# Patient Record
Sex: FEMALE | Race: WHITE | NOT HISPANIC OR LATINO | URBAN - METROPOLITAN AREA
[De-identification: names, ages, dates, MRNs, and addresses within clinical notes are randomized per-mention and may not be internally consistent; named-entity substitution may affect disease eponyms.]

---

## 2019-11-17 ENCOUNTER — EMERGENCY (EMERGENCY)
Facility: HOSPITAL | Age: 24
LOS: 0 days | Discharge: HOME | End: 2019-11-17
Attending: EMERGENCY MEDICINE
Payer: COMMERCIAL

## 2019-11-17 VITALS
TEMPERATURE: 98 F | SYSTOLIC BLOOD PRESSURE: 137 MMHG | OXYGEN SATURATION: 100 % | HEART RATE: 96 BPM | DIASTOLIC BLOOD PRESSURE: 88 MMHG | RESPIRATION RATE: 16 BRPM

## 2019-11-17 VITALS — WEIGHT: 214.95 LBS

## 2019-11-17 DIAGNOSIS — N76.0 ACUTE VAGINITIS: ICD-10-CM

## 2019-11-17 DIAGNOSIS — N76.2 ACUTE VULVITIS: ICD-10-CM

## 2019-11-17 DIAGNOSIS — R31.21 ASYMPTOMATIC MICROSCOPIC HEMATURIA: ICD-10-CM

## 2019-11-17 LAB
APPEARANCE UR: ABNORMAL
BILIRUB UR-MCNC: NEGATIVE — SIGNIFICANT CHANGE UP
COLOR SPEC: YELLOW — SIGNIFICANT CHANGE UP
DIFF PNL FLD: ABNORMAL
GLUCOSE UR QL: NEGATIVE MG/DL — SIGNIFICANT CHANGE UP
HSV+VZV DNA SPEC QL NAA+PROBE: ABNORMAL
KETONES UR-MCNC: ABNORMAL
LEUKOCYTE ESTERASE UR-ACNC: ABNORMAL
NITRITE UR-MCNC: NEGATIVE — SIGNIFICANT CHANGE UP
PH UR: 5 — SIGNIFICANT CHANGE UP (ref 5–8)
PROT UR-MCNC: 15 MG/DL
SP GR SPEC: 1.02 — SIGNIFICANT CHANGE UP (ref 1.01–1.02)
SPECIMEN SOURCE: SIGNIFICANT CHANGE UP
UROBILINOGEN FLD QL: NEGATIVE MG/DL — SIGNIFICANT CHANGE UP

## 2019-11-17 PROCEDURE — 99283 EMERGENCY DEPT VISIT LOW MDM: CPT | Mod: 25

## 2019-11-17 PROCEDURE — 87798 DETECT AGENT NOS DNA AMP: CPT

## 2019-11-17 PROCEDURE — 87086 URINE CULTURE/COLONY COUNT: CPT

## 2019-11-17 PROCEDURE — 96372 THER/PROPH/DIAG INJ SC/IM: CPT

## 2019-11-17 PROCEDURE — 87591 N.GONORRHOEAE DNA AMP PROB: CPT

## 2019-11-17 PROCEDURE — 81001 URINALYSIS AUTO W/SCOPE: CPT

## 2019-11-17 PROCEDURE — 86695 HERPES SIMPLEX TYPE 1 TEST: CPT

## 2019-11-17 PROCEDURE — 99283 EMERGENCY DEPT VISIT LOW MDM: CPT

## 2019-11-17 PROCEDURE — 86696 HERPES SIMPLEX TYPE 2 TEST: CPT

## 2019-11-17 PROCEDURE — 36415 COLL VENOUS BLD VENIPUNCTURE: CPT

## 2019-11-17 PROCEDURE — 87491 CHLMYD TRACH DNA AMP PROBE: CPT

## 2019-11-17 PROCEDURE — 87529 HSV DNA AMP PROBE: CPT

## 2019-11-17 PROCEDURE — 87186 SC STD MICRODIL/AGAR DIL: CPT

## 2019-11-17 RX ORDER — IBUPROFEN 200 MG
600 TABLET ORAL ONCE
Refills: 0 | Status: COMPLETED | OUTPATIENT
Start: 2019-11-17 | End: 2019-11-17

## 2019-11-17 RX ORDER — VALACYCLOVIR 500 MG/1
1 TABLET, FILM COATED ORAL
Qty: 14 | Refills: 0
Start: 2019-11-17 | End: 2019-11-23

## 2019-11-17 RX ORDER — CEFTRIAXONE 500 MG/1
250 INJECTION, POWDER, FOR SOLUTION INTRAMUSCULAR; INTRAVENOUS ONCE
Refills: 0 | Status: COMPLETED | OUTPATIENT
Start: 2019-11-17 | End: 2019-11-17

## 2019-11-17 RX ORDER — AZITHROMYCIN 500 MG/1
1000 TABLET, FILM COATED ORAL ONCE
Refills: 0 | Status: COMPLETED | OUTPATIENT
Start: 2019-11-17 | End: 2019-11-17

## 2019-11-17 RX ORDER — LIDOCAINE 4 G/100G
10 CREAM TOPICAL
Qty: 150 | Refills: 0
Start: 2019-11-17

## 2019-11-17 RX ADMIN — Medication 600 MILLIGRAM(S): at 12:59

## 2019-11-17 RX ADMIN — CEFTRIAXONE 250 MILLIGRAM(S): 500 INJECTION, POWDER, FOR SOLUTION INTRAMUSCULAR; INTRAVENOUS at 14:25

## 2019-11-17 RX ADMIN — AZITHROMYCIN 1000 MILLIGRAM(S): 500 TABLET, FILM COATED ORAL at 14:26

## 2019-11-17 NOTE — ED STATDOCS - NSFOLLOWUPINSTRUCTIONS_ED_ALL_ED_FT
Vaginitis  Image   Vaginitis is irritation and swelling (inflammation) of the vagina. It happens when normal bacteria and yeast in the vagina grow too much. There are many types of this condition. Treatment will depend on the type you have.  Follow these instructions at home:  Lifestyle     Keep your vagina area clean and dry.  Avoid using soap.Rinse the area with water.Do not do the following until your doctor says it is okay:  Wash and clean out the vagina (douche).Use tampons.Have sex.Wipe from front to back after going to the bathroom.Let air reach your vagina.  Wear cotton underwear.Do not wear:  Underwear while you sleep.Tight pants.Thong underwear.Underwear or nylons without a cotton panel.Take off any wet clothing, such as bathing suits, as soon as possible.Use gentle, non-scented products. Do not use things that can irritate the vagina, such as fabric softeners. Avoid the following products if they are scented:  Feminine sprays.Detergents.Tampons.Feminine hygiene products.Soaps or bubble baths.Practice safe sex and use condoms.General instructions     Take over-the-counter and prescription medicines only as told by your doctor.If you were prescribed an antibiotic medicine, take or use it as told by your doctor. Do not stop taking or using the antibiotic even if you start to feel better.Keep all follow-up visits as told by your doctor. This is important.Contact a doctor if:  You have pain in your belly.You have a fever.Your symptoms last for more than 2–3 days.Get help right away if:  You have a fever and your symptoms get worse all of a sudden.Summary  Vaginitis is irritation and swelling of the vagina. It can happen when the normal bacteria and yeast in the vagina grow too much. There are many types.Treatment will depend on the type you have.Do not douche, use tampons , or have sex until your health care provider approves. When you can return to sex, practice safe sex and use condoms.This information is not intended to replace advice given to you by your health care provider. Make sure you discuss any questions you have with your health care provider.

## 2019-11-17 NOTE — ED ADULT TRIAGE NOTE - CHIEF COMPLAINT QUOTE
pt c/o vaginal sores, that have developed over the apst week. pt states on monday she had a yeast infection, and that on  tuesday she took monastat, pt sattes since then she ahs developed sores. no fever. pt states she does not know if this is an allergic reaction to monastat

## 2019-11-17 NOTE — ED STATDOCS - PROGRESS NOTE DETAILS
23 y/o F with no PMH presents with vaginal sores worse since yesterday. Pt states she initiated treatment for yeast infection with monistat last weel x 3 days. States symptoms worsened over the last 2-3 days. Went to outside urgent care, was started on prednisone and diflucan. States symptoms have not changed. Denies fever, chills, nausea, vomiting. States she has had 1 sexual partner since her last STD assessment 1 year ago. Minimal improvement in symptoms with use of advil. +dysuria. PE: Uncomfortable appearing. Cardiac: s1s2, RRR. Lungs: CTAB. Abdomen: NBS x4, soft, nontender. ; chaperoned by JOLLY Rosado. +multiple sores to labia. No active discharge. ++TTP external labia. A/P: Vaginal pain. 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 Plan for UA, labs, culture of sores. Reassess. - Iam Mccloud PA-C 25 y/o F with no PMH presents with vaginal sores worse since yesterday. Pt states she initiated treatment for yeast infection with monistat last weel x 3 days. States symptoms worsened over the last 2-3 days. Went to outside urgent care, was started on prednisone and diflucan. States symptoms have not changed. Denies fever, chills, nausea, vomiting. States she has had 1 sexual partner since her last STD assessment 1 year ago. Minimal improvement in symptoms with use of advil. +dysuria. PE: Uncomfortable appearing. Cardiac: s1s2, RRR. Lungs: CTAB. Abdomen: NBS x4, soft, nontender. ; chaperoned by JOLLY Rodney. +multiple sores to labia. No active discharge. ++TTP external labia. A/P: Vaginal pain. 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 Plan for UA, labs, culture of sores. Reassess. - Iam Mccloud PA-C UA reviewed with patient. Will manage as HSV infection. Advised to continue with diflucan. Will cover for GC/Chlamydia as well. Encouraged OBGYN follow up. Will also provide urology referral due to calcium oxalate crystals and microscopic hematuria. - Iam Mccloud PA-C

## 2019-11-17 NOTE — ED STATDOCS - CARE PROVIDER_API CALL
Ilana Tam)  Urology  [address] [place] [address]  Phone: [phone]  Fax: [phone]  Follow Up Time:

## 2019-11-17 NOTE — ED STATDOCS - GENITOURINARY, MLM
normal... sores to external labia no vaginal discharge, pt can not tolerate internal exam, no fluctuance. RN as chaperone

## 2019-11-17 NOTE — ED STATDOCS - PATIENT PORTAL LINK FT
You can access the FollowMyHealth Patient Portal offered by [facility] by registering at the following website: [email or web address]. By joining [facility]’s FollowMyHealth portal, you will also be able to view your health information using other applications (apps) compatible with our system.

## 2019-11-17 NOTE — ED STATDOCS - CARE PLAN
Principal Discharge DX:	Vulvovaginal candidiasis  Secondary Diagnosis:	Microscopic hematuria Principal Discharge DX:	Acute vaginitis  Secondary Diagnosis:	Microscopic hematuria Principal Discharge DX:	Acute vaginitis  Secondary Diagnosis:	Microscopic hematuria  Secondary Diagnosis:	Vaginal sore

## 2019-11-17 NOTE — ED STATDOCS - ATTENDING CONTRIBUTION TO CARE
I, Michael Porter, performed the initial face to face bedside interview with this patient regarding history of present illness, review of symptoms and relevant past medical, social and family history.  I completed an independent physical examination.  I was the initial provider who evaluated this patient. I have signed out the follow up of any pending tests (i.e. labs, radiological studies) to the ACP.  I have communicated the patient’s plan of care and disposition with the ACP.  The history, relevant review of systems, past medical and surgical history, medical decision making, and physical examination was documented by the scribe in my presence and I attest to the accuracy of the documentation.

## 2019-11-17 NOTE — ED ADULT NURSE NOTE - NSIMPLEMENTINTERV_GEN_ALL_ED
Implemented All Universal Safety Interventions:  [place] to call system. Call bell, personal items and telephone within reach. Instruct patient to call for assistance. Room bathroom lighting operational. Non-slip footwear when patient is off stretcher. Physically safe environment: no spills, clutter or unnecessary equipment. Stretcher in lowest position, wheels locked, appropriate side rails in place.

## 2019-11-17 NOTE — ED STATDOCS - OBJECTIVE STATEMENT
25 y/o female presents to the ED c/o vaginal sores since yesterday. States she had a yeast infection with white discharge that started on 11/11 and took Monistat the next day for 3 days, and then developed worsening vaginal burning and itching. +burning urination. Was seen in UC yesterday and was given Diflucan and Prednisone. Denies fever. Pt is sexually active and had an STI w/u last year and states she has been with the same partner. Took Advil 2 days ago with minimal relief.

## 2019-11-18 LAB
HSV1 IGG SER-ACNC: 0.22 INDEX — SIGNIFICANT CHANGE UP
HSV1 IGG SERPL QL IA: NEGATIVE — SIGNIFICANT CHANGE UP
HSV2 IGG FLD-ACNC: 0.32 INDEX — SIGNIFICANT CHANGE UP
HSV2 IGG FLD-ACNC: 0.32 INDEX — SIGNIFICANT CHANGE UP
HSV2 IGG SERPL QL IA: NEGATIVE — SIGNIFICANT CHANGE UP
HSV2 IGG SERPL QL IA: NEGATIVE — SIGNIFICANT CHANGE UP

## 2019-11-18 NOTE — ED POST DISCHARGE NOTE - RESULT SUMMARY
+HSV1. Spoke to pt on cell number [phone] and informed her of result, that it is contagious and an STI, she must inform sexual partners and use condoms for sex, including oral. Pt has appt with her GYN today, she will discuss further with her. Pt on valtrex.  signed Lana Garcia PA-C

## 2019-11-18 NOTE — ED POST DISCHARGE NOTE - DETAILS
I spoke with patient and sent Rx for Bactrim to pharmacy.  Rose [facility]ORNE urine culture also grew enterococcus, only PO abx both are susceptible to is cipro, called and again changed abx to cipro, advised PMD or GYN f/u for repeat urine culture  Kell Christie PA-C

## 2019-11-19 LAB
-  AMIKACIN: SIGNIFICANT CHANGE UP
-  AMPICILLIN/SULBACTAM: SIGNIFICANT CHANGE UP
-  AMPICILLIN: SIGNIFICANT CHANGE UP
-  AMPICILLIN: SIGNIFICANT CHANGE UP
-  AZTREONAM: SIGNIFICANT CHANGE UP
-  CEFAZOLIN: SIGNIFICANT CHANGE UP
-  CEFEPIME: SIGNIFICANT CHANGE UP
-  CEFOXITIN: SIGNIFICANT CHANGE UP
-  CEFTRIAXONE: SIGNIFICANT CHANGE UP
-  CIPROFLOXACIN: SIGNIFICANT CHANGE UP
-  CIPROFLOXACIN: SIGNIFICANT CHANGE UP
-  ERTAPENEM: SIGNIFICANT CHANGE UP
-  GENTAMICIN: SIGNIFICANT CHANGE UP
-  IMIPENEM: SIGNIFICANT CHANGE UP
-  LEVOFLOXACIN: SIGNIFICANT CHANGE UP
-  LEVOFLOXACIN: SIGNIFICANT CHANGE UP
-  MEROPENEM: SIGNIFICANT CHANGE UP
-  NITROFURANTOIN: SIGNIFICANT CHANGE UP
-  NITROFURANTOIN: SIGNIFICANT CHANGE UP
-  PIPERACILLIN/TAZOBACTAM: SIGNIFICANT CHANGE UP
-  TETRACYCLINE: SIGNIFICANT CHANGE UP
-  TIGECYCLINE: SIGNIFICANT CHANGE UP
-  TOBRAMYCIN: SIGNIFICANT CHANGE UP
-  TRIMETHOPRIM/SULFAMETHOXAZOLE: SIGNIFICANT CHANGE UP
-  VANCOMYCIN: SIGNIFICANT CHANGE UP
CULTURE RESULTS: SIGNIFICANT CHANGE UP
METHOD TYPE: SIGNIFICANT CHANGE UP
METHOD TYPE: SIGNIFICANT CHANGE UP
ORGANISM # SPEC MICROSCOPIC CNT: SIGNIFICANT CHANGE UP
SPECIMEN SOURCE: SIGNIFICANT CHANGE UP

## 2019-11-19 RX ORDER — AZTREONAM 2 G
1 VIAL (EA) INJECTION
Qty: 10 | Refills: 0
Start: 2019-11-19 | End: 2019-11-23

## 2019-11-20 LAB
HSV1 AB FLD QL: SIGNIFICANT CHANGE UP TITER
HSV2 AB FLD-ACNC: SIGNIFICANT CHANGE UP TITER

## 2019-11-20 RX ORDER — CIPROFLOXACIN LACTATE 400MG/40ML
1 VIAL (ML) INTRAVENOUS
Qty: 10 | Refills: 0
Start: 2019-11-20 | End: 2019-11-24
